# Patient Record
Sex: FEMALE | Race: BLACK OR AFRICAN AMERICAN | URBAN - METROPOLITAN AREA
[De-identification: names, ages, dates, MRNs, and addresses within clinical notes are randomized per-mention and may not be internally consistent; named-entity substitution may affect disease eponyms.]

---

## 2018-11-12 ENCOUNTER — OFFICE VISIT (OUTPATIENT)
Dept: INTERNAL MEDICINE CLINIC | Age: 33
End: 2018-11-12

## 2018-11-12 VITALS
HEIGHT: 66 IN | SYSTOLIC BLOOD PRESSURE: 125 MMHG | WEIGHT: 211.4 LBS | DIASTOLIC BLOOD PRESSURE: 86 MMHG | RESPIRATION RATE: 12 BRPM | TEMPERATURE: 97.4 F | OXYGEN SATURATION: 79 % | HEART RATE: 79 BPM | BODY MASS INDEX: 33.97 KG/M2

## 2018-11-12 DIAGNOSIS — Z13.1 ENCOUNTER FOR SCREENING EXAMINATION FOR IMPAIRED GLUCOSE REGULATION AND DIABETES MELLITUS: ICD-10-CM

## 2018-11-12 DIAGNOSIS — Z13.220 SCREENING FOR CHOLESTEROL LEVEL: ICD-10-CM

## 2018-11-12 DIAGNOSIS — R63.5 WEIGHT GAIN: ICD-10-CM

## 2018-11-12 RX ORDER — DICLOFENAC SODIUM 100 MG/1
TABLET, FILM COATED, EXTENDED RELEASE ORAL
Refills: 0 | COMMUNITY
Start: 2018-11-08

## 2018-11-12 RX ORDER — METHOCARBAMOL 500 MG/1
TABLET, FILM COATED ORAL
Refills: 0 | COMMUNITY
Start: 2018-11-08

## 2018-11-12 NOTE — PROGRESS NOTES
FAMILY MEDICINE CLINIC NOTE 
 
S: The patient presents for follow up after a recent ER admission at [place] [address] on 11/8/18. ED provider report reviewed. Patient was noted to have rib pain, prescribed diclofenac and robaxin for the pain. She states that the new medications have been helpful. She states that she has had a problem with weight gain for the last few months. She relates 12 pound weight gain in the last 3 months. She recently started a new workout routine. She is amenable to a nutrition referral.  
 
She is not a smoker, drinks occasionally on weekends. Denies angina, dyspnea, palpitations or edema. She declines a flu shot today. Current Outpatient Medications on File Prior to Visit Medication Sig Dispense Refill  diclofenac (VOLTAREN XR) 100 mg ER tablet TK 1 T  PO ONCE QD  0  
 methocarbamol (ROBAXIN) 500 mg tablet TK 1 T  PO QID  0 No current facility-administered medications on file prior to visit. History reviewed. No pertinent past medical history. Social History Socioeconomic History  Marital status: SINGLE Spouse name: Not on file  Number of children: Not on file  Years of education: Not on file  Highest education level: Not on file Social Needs  Financial resource strain: Not on file  Food insecurity - worry: Not on file  Food insecurity - inability: Not on file  Transportation needs - medical: Not on file  Transportation needs - non-medical: Not on file Occupational History  Not on file Tobacco Use  Smoking status: Never Smoker  Smokeless tobacco: Never Used Substance and Sexual Activity  Alcohol use: Yes Comment: socially  Drug use: No  
 Sexual activity: Yes  
  Partners: Male Birth control/protection: Implant Other Topics Concern  Not on file Social History Narrative  Not on file Family History Problem Relation Age of Onset  Hypertension Mother  High Cholesterol Mother  No Known Problems Father O: 
Visit Vitals /86 Pulse 79 Temp 97.4 °F (36.3 °C) (Oral) Resp 12 Ht 5' 6\" (1.676 m) Wt 211 lb 6.4 oz (95.9 kg) SpO2 (!) 79% BMI 34.12 kg/m² NAD, comfortable Obese No thyromegaly No LAD 
RRR, no murmurs CTABL, no wheezing/ronchi/rales 
abd soft ND NT normoactive Bs No edema 
 
34 y.o. female ICD-10-CM ICD-9-CM 1. BMI 34.0-34.9,adult Z68.34 V85.34 HEMOGLOBIN A1C WITH EAG  
   TSH 3RD GENERATION  
   REFERRAL TO NUTRITION  
   METABOLIC PANEL, COMPREHENSIVE  
   LDL, DIRECT  
   LDL, DIRECT  
   METABOLIC PANEL, COMPREHENSIVE  
   TSH 3RD GENERATION  
   HEMOGLOBIN A1C WITH EAG 2. Encounter for screening examination for impaired glucose regulation and diabetes mellitus Z13.1 V77.1 HEMOGLOBIN A1C WITH EAG  
   HEMOGLOBIN A1C WITH EAG 3. Screening for cholesterol level Z13.220 V77.91 LDL, DIRECT  
   LDL, DIRECT 4. Weight gain R63.5 783.1 HEMOGLOBIN A1C WITH EAG  
   TSH 3RD GENERATION  
   REFERRAL TO NUTRITION  
   METABOLIC PANEL, COMPREHENSIVE  
   METABOLIC PANEL, COMPREHENSIVE  
   TSH 3RD GENERATION  
   HEMOGLOBIN A1C WITH EAG Counseled on dietary modification and increased physical activity. Follow up in 3 months

## 2018-11-12 NOTE — PROGRESS NOTES
Blake Soriano is a 34 y.o. female (: 1985) presenting to address: Chief Complaint Patient presents with  
[facility] Follow Up Pt went to urgent care for cold symptoms on Wednesday and went to ER on thursday for  SOB and was told she had inflammed ribs Vitals:  
 18 7214 BP: 125/86 Pulse: 79 Resp: 12 Temp: 97.4 °F (36.3 °C) TempSrc: Oral  
SpO2: (!) 79% Weight: 211 lb 6.4 oz (95.9 kg) Height: 5' 6\" (1.676 m) PainSc:   3 PainLoc: Back LMP: 2018 Hearing/Vision: No exam data present Learning Assessment:  
 
Learning Assessment 2018 PRIMARY LEARNER Patient HIGHEST LEVEL OF EDUCATION - PRIMARY LEARNER  > 4 YEARS OF COLLEGE PRIMARY LANGUAGE ENGLISH  
LEARNER PREFERENCE PRIMARY READING  
ANSWERED BY patient RELATIONSHIP SELF Depression Screening: PHQ over the last two weeks 2018 Little interest or pleasure in doing things Not at all Feeling down, depressed, irritable, or hopeless Not at all Total Score PHQ 2 0 Fall Risk Assessment:  
 
Fall Risk Assessment, last 12 mths 2018 Able to walk? Yes Fall in past 12 months? No  
 
Abuse Screening:  
 
Abuse Screening Questionnaire 2018 Do you ever feel afraid of your partner? Syd Horn Are you in a relationship with someone who physically or mentally threatens you? Syd Horn Is it safe for you to go home? Sebas Carter Coordination of Care Questionaire: 1. Have you been to the ER, urgent care clinic since your last visit? Hospitalized since your last visit? YES 
 
2. Have you seen or consulted any other health care providers outside of the [address] [place] [address] since your last visit? Include any pap smears or colon screening. NO Advanced Directive: 1. Do you have an Advanced Directive? NO 
 
2. Would you like information on Advanced Directives? NO Pt declines flu vaccine.

## 2018-11-13 LAB
A-G RATIO,AGRAT: 1.5 RATIO (ref 1.1–2.6)
ALBUMIN SERPL-MCNC: 4.4 G/DL (ref 3.5–5)
ALP SERPL-CCNC: 87 U/L (ref 25–115)
ALT SERPL-CCNC: 24 U/L (ref 5–40)
ANION GAP SERPL CALC-SCNC: 22 MMOL/L
AST SERPL W P-5'-P-CCNC: 26 U/L (ref 10–37)
AVG GLU, 10930: 101 MG/DL (ref 91–123)
BILIRUB SERPL-MCNC: 0.4 MG/DL (ref 0.2–1.2)
BUN SERPL-MCNC: 15 MG/DL (ref 6–22)
CALCIUM SERPL-MCNC: 9.3 MG/DL (ref 8.4–10.5)
CHLORIDE SERPL-SCNC: 99 MMOL/L (ref 98–110)
CO2 SERPL-SCNC: 23 MMOL/L (ref 20–32)
CREAT SERPL-MCNC: 0.9 MG/DL (ref 0.5–1.2)
GFRAA, 66117: >60
GFRNA, 66118: >60
GLOBULIN,GLOB: 2.9 G/DL (ref 2–4)
GLUCOSE SERPL-MCNC: 90 MG/DL (ref 70–99)
HBA1C MFR BLD HPLC: 5.2 % (ref 4.8–5.9)
LDL, DIRECT,DLDL: 144 MG/DL (ref 50–99)
POTASSIUM SERPL-SCNC: 4.5 MMOL/L (ref 3.5–5.5)
PROT SERPL-MCNC: 7.3 G/DL (ref 6.4–8.3)
SODIUM SERPL-SCNC: 144 MMOL/L (ref 133–145)
TSH SERPL DL<=0.005 MIU/L-ACNC: 1.25 MCU/ML (ref 0.27–4.2)

## 2018-11-13 NOTE — PROGRESS NOTES
Please call this patient. Inform her that her cholesterol is a bit elevated. She can increase consumption of fresh fruits, vegetables and oats. Decrease consumption of meat and eggs and increase physical activity. We can check her labs again in 3 months. Thank you Dr. Erinn Henderson

## 2018-11-14 ENCOUNTER — TELEPHONE (OUTPATIENT)
Dept: INTERNAL MEDICINE CLINIC | Age: 33
End: 2018-11-14

## 2018-11-14 NOTE — TELEPHONE ENCOUNTER
----- Message from Fadi Zamora MD sent at 11/13/2018  2:38 PM EST -----  Please call this patient. Inform her that her cholesterol is a bit elevated. She can increase consumption of fresh fruits, vegetables and oats. Decrease consumption of meat and eggs and increase physical activity. We can check her labs again in 3 months.      Thank you     Dr. Michelle Weir

## 2018-12-28 ENCOUNTER — OFFICE VISIT (OUTPATIENT)
Dept: INTERNAL MEDICINE CLINIC | Age: 33
End: 2018-12-28

## 2018-12-28 VITALS
BODY MASS INDEX: 33.75 KG/M2 | DIASTOLIC BLOOD PRESSURE: 79 MMHG | HEIGHT: 66 IN | RESPIRATION RATE: 18 BRPM | TEMPERATURE: 97.6 F | HEART RATE: 80 BPM | WEIGHT: 210 LBS | OXYGEN SATURATION: 99 % | SYSTOLIC BLOOD PRESSURE: 136 MMHG

## 2018-12-28 DIAGNOSIS — J02.9 SORE THROAT: Primary | ICD-10-CM

## 2018-12-28 LAB
S PYO AG THROAT QL: NEGATIVE
VALID INTERNAL CONTROL?: YES

## 2018-12-28 RX ORDER — IBUPROFEN 200 MG
TABLET ORAL
COMMUNITY

## 2018-12-28 NOTE — PROGRESS NOTES
HISTORY OF PRESENT ILLNESS  Norman Yanez is a 34 y.o. female. Sore Throat    The history is provided by the patient. This is a new problem. The current episode started more than 1 week ago. The problem has not changed since onset. There has been no fever. Associated symptoms include trouble swallowing and cough. Pertinent negatives include no vomiting, no congestion, no drooling, no ear discharge, no ear pain, no headaches, no plugged ear sensation, no shortness of breath, no stridor, no swollen glands and no stiff neck. She has had no exposure to strep. She has tried nothing for the symptoms. The treatment provided no relief. Review of Systems   Constitutional: Negative for chills and fever. HENT: Positive for sore throat and trouble swallowing. Negative for congestion, drooling, ear discharge and ear pain. Eyes: Negative for blurred vision and double vision. Respiratory: Positive for cough. Negative for sputum production, shortness of breath and stridor. Gastrointestinal: Negative for heartburn, nausea and vomiting. Neurological: Negative for dizziness and headaches. Psychiatric/Behavioral: The patient is not nervous/anxious. Physical Exam   Constitutional: She is oriented to person, place, and time. She appears well-developed and well-nourished. No distress. HENT:   Head: Normocephalic and atraumatic. Right Ear: External ear normal.   Left Ear: External ear normal.   Mouth/Throat: Oropharynx is clear and moist. No oropharyngeal exudate. Eyes: EOM are normal. Pupils are equal, round, and reactive to light. Neck: Neck supple. Cardiovascular: Regular rhythm. Pulmonary/Chest: Breath sounds normal. No respiratory distress. She has no wheezes. Lymphadenopathy:     She has no cervical adenopathy. Neurological: She is alert and oriented to person, place, and time. No cranial nerve deficit. Skin: Skin is dry. She is not diaphoretic.    Psychiatric: She has a normal mood and affect. Nursing note and vitals reviewed. ASSESSMENT and PLAN    ICD-10-CM ICD-9-CM    1. Sore throat J02.9 462 AMB POC RAPID STREP A   Take rest and plenty of fluids. Alternate tylenol and ibuprofen for fever. Take mucinex for cough. Return to clinic if condition worsens in next 72 hours.

## 2018-12-28 NOTE — PROGRESS NOTES
ROOM # 2  Identified pt with two pt identifiers(name and ). Reviewed record in preparation for visit and have obtained necessary documentation. Chief Complaint   Patient presents with    Sore Throat     x 1 wk       Norman Lewis preferred language for health care discussion is english/other. Is the patient using any DME equipment during OV? Paulo Moody is due for:  Health Maintenance Due   Topic    DTaP/Tdap/Td series (1 - Tdap)    PAP AKA CERVICAL CYTOLOGY     Influenza Age 5 to Adult      Health Maintenance reviewed and discussed per provider  Please order/place referral if appropriate. Advance Directive:  1. Do you have an advance directive in place? Patient Reply: NO    2. If not, would you like material regarding how to put one in place? NO    Coordination of Care:  1. Have you been to the ER, urgent care clinic since your last visit? Hospitalized since your last visit? NO    2. Have you seen or consulted any other health care providers outside of the [address] [place] [address] since your last visit? Include any pap smears or colon screening. NO    Patient is accompanied by self I have received verbal consent from Jad Weaver to discuss any/all medical information while they are present in the room. Learning Assessment:  Learning Assessment 2018   PRIMARY LEARNER Patient   HIGHEST LEVEL OF EDUCATION - PRIMARY LEARNER  > 4 YEARS OF COLLEGE   PRIMARY LANGUAGE ENGLISH   LEARNER PREFERENCE PRIMARY READING   ANSWERED BY patient   RELATIONSHIP SELF     Depression Screening:  PHQ over the last two weeks 2018   Little interest or pleasure in doing things Not at all   Feeling down, depressed, irritable, or hopeless Not at all   Total Score PHQ 2 0     Abuse Screening:  Abuse Screening Questionnaire 2018   Do you ever feel afraid of your partner? N   Are you in a relationship with someone who physically or mentally threatens you?  N   Is it safe for you to go home? Y     Fall Risk  Fall Risk Assessment, last 12 mths 11/12/2018   Able to walk? Yes   Fall in past 12 months?  No